# Patient Record
Sex: FEMALE | Race: WHITE
[De-identification: names, ages, dates, MRNs, and addresses within clinical notes are randomized per-mention and may not be internally consistent; named-entity substitution may affect disease eponyms.]

---
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
## 2022-11-03 ENCOUNTER — HOSPITAL ENCOUNTER (OUTPATIENT)
Dept: HOSPITAL 22 - LAB.DROPOF | Age: 22
End: 2022-11-03
Payer: COMMERCIAL

## 2022-11-03 DIAGNOSIS — R53.83: Primary | ICD-10-CM

## 2022-11-03 LAB
ALBUMIN LEVEL: 4.6 G/DL (ref 3.5–5)
ALBUMIN/GLOB SERPL: 1.4 {RATIO} (ref 1.1–1.8)
ALP ISO SERPL-ACNC: 93 U/L (ref 38–126)
ALT SERPLBLD-CCNC: 30 U/L (ref 12–78)
ANION GAP SERPL CALC-SCNC: 17.1 MEQ/L (ref 5–15)
AST SERPL QL: 30 U/L (ref 14–36)
BILIRUBIN,TOTAL: 0.3 MG/DL (ref 0.2–1.3)
BUN SERPL-MCNC: 11 MG/DL (ref 7–17)
CALCIUM SPEC-MCNC: 10.1 MG/DL (ref 8.4–10.2)
CELLS COUNTED: 100
CHLORIDE SPEC-SCNC: 102 MMOL/L (ref 98–107)
CO2 SERPL-SCNC: 24 MMOL/L (ref 22–30)
CREAT BLD-SCNC: 0.7 MG/DL (ref 0.52–1.04)
ESTIMATED GLOMERULAR FILT RATE: 105 ML/MIN (ref 60–?)
GFR (AFRICAN AMERICAN): 127 ML/MIN (ref 60–?)
GLOBULIN SER CALC-MCNC: 3.2 G/DL (ref 1.3–3.2)
GLUCOSE: 87 MG/DL (ref 74–100)
HCT VFR BLD CALC: 41.3 % (ref 37–47)
HGB BLD-MCNC: 13.1 G/DL (ref 12.2–16.2)
MANUAL DIFFERENTIAL: (no result)
MCHC RBC-ENTMCNC: 31.7 G/DL (ref 31.8–35.4)
MCV RBC: 83.6 FL (ref 81–99)
MEAN CORPUSCULAR HEMOGLOBIN: 26.5 PG (ref 27–31.2)
PLATELET # BLD: 484 K/MM3 (ref 142–424)
POTASSIUM: 4.1 MMOL/L (ref 3.5–5.1)
PROT SERPL-MCNC: 7.8 G/DL (ref 6.3–8.2)
RBC # BLD AUTO: 4.93 M/MM3 (ref 4.2–5.4)
SODIUM SPEC-SCNC: 139 MMOL/L (ref 136–145)
TSH SERPL-ACNC: 1.83 UIU/ML (ref 0.47–4.68)
WBC # BLD AUTO: 12.3 K/MM3 (ref 4.8–10.8)

## 2022-11-03 PROCEDURE — 85048 AUTOMATED LEUKOCYTE COUNT: CPT

## 2022-11-03 PROCEDURE — 85018 HEMOGLOBIN: CPT

## 2022-11-03 PROCEDURE — 80053 COMPREHEN METABOLIC PANEL: CPT

## 2022-11-03 PROCEDURE — 85014 HEMATOCRIT: CPT

## 2022-11-03 PROCEDURE — 84443 ASSAY THYROID STIM HORMONE: CPT

## 2022-11-03 PROCEDURE — 85049 AUTOMATED PLATELET COUNT: CPT

## 2022-11-03 PROCEDURE — 85007 BL SMEAR W/DIFF WBC COUNT: CPT

## 2023-02-06 ENCOUNTER — HOSPITAL ENCOUNTER (OUTPATIENT)
Age: 23
End: 2023-02-06
Payer: COMMERCIAL

## 2023-02-06 DIAGNOSIS — R50.9: Primary | ICD-10-CM

## 2023-02-06 PROCEDURE — U0005 INFEC AGEN DETEC AMPLI PROBE: HCPCS

## 2023-02-06 PROCEDURE — U0003 INFECTIOUS AGENT DETECTION BY NUCLEIC ACID (DNA OR RNA); SEVERE ACUTE RESPIRATORY SYNDROME CORONAVIRUS 2 (SARS-COV-2) (CORONAVIRUS DISEASE [COVID-19]), AMPLIFIED PROBE TECHNIQUE, MAKING USE OF HIGH THROUGHPUT TECHNOLOGIES AS DESCRIBED BY CMS-2020-01-R: HCPCS

## 2023-02-06 PROCEDURE — C9803 HOPD COVID-19 SPEC COLLECT: HCPCS

## 2023-04-14 ENCOUNTER — HOSPITAL ENCOUNTER (OUTPATIENT)
Dept: HOSPITAL 22 - RAD | Age: 23
End: 2023-04-14
Payer: COMMERCIAL

## 2023-04-14 DIAGNOSIS — N64.4: Primary | ICD-10-CM

## 2023-04-14 PROCEDURE — 76641 ULTRASOUND BREAST COMPLETE: CPT

## 2023-09-13 ENCOUNTER — HOSPITAL ENCOUNTER (OUTPATIENT)
Dept: HOSPITAL 22 - LAB.DROPOF | Age: 23
End: 2023-09-13
Payer: COMMERCIAL

## 2023-09-13 DIAGNOSIS — N92.6: Primary | ICD-10-CM

## 2023-09-13 DIAGNOSIS — Z32.00: ICD-10-CM

## 2023-09-13 PROCEDURE — 84703 CHORIONIC GONADOTROPIN ASSAY: CPT

## 2023-11-29 ENCOUNTER — LAB (OUTPATIENT)
Dept: LAB | Facility: HOSPITAL | Age: 23
End: 2023-11-29
Payer: COMMERCIAL

## 2023-11-29 ENCOUNTER — TRANSCRIBE ORDERS (OUTPATIENT)
Dept: LAB | Facility: HOSPITAL | Age: 23
End: 2023-11-29
Payer: COMMERCIAL

## 2023-11-29 DIAGNOSIS — N97.9 PRIMARY FEMALE INFERTILITY: Primary | ICD-10-CM

## 2023-11-29 DIAGNOSIS — N97.9 PRIMARY FEMALE INFERTILITY: ICD-10-CM

## 2023-11-29 PROCEDURE — 36415 COLL VENOUS BLD VENIPUNCTURE: CPT

## 2023-11-29 PROCEDURE — 84144 ASSAY OF PROGESTERONE: CPT

## 2023-11-30 LAB — PROGEST SERPL-MCNC: 8.52 NG/ML

## 2024-01-18 ENCOUNTER — TRANSCRIBE ORDERS (OUTPATIENT)
Dept: LAB | Facility: HOSPITAL | Age: 24
End: 2024-01-18
Payer: COMMERCIAL

## 2024-01-18 ENCOUNTER — LAB (OUTPATIENT)
Dept: LAB | Facility: HOSPITAL | Age: 24
End: 2024-01-18
Payer: COMMERCIAL

## 2024-01-18 DIAGNOSIS — N97.9 PRIMARY FEMALE INFERTILITY: Primary | ICD-10-CM

## 2024-01-18 DIAGNOSIS — N97.9 PRIMARY FEMALE INFERTILITY: ICD-10-CM

## 2024-01-18 LAB
ESTRADIOL SERPL HS-MCNC: 92.2 PG/ML
FSH SERPL-ACNC: 4.72 MIU/ML
LH SERPL-ACNC: 6.57 MIU/ML
PROGEST SERPL-MCNC: 0.16 NG/ML
PROLACTIN SERPL-MCNC: 12.6 NG/ML (ref 4.79–23.3)
TSH SERPL DL<=0.05 MIU/L-ACNC: 2.11 UIU/ML (ref 0.27–4.2)

## 2024-01-18 PROCEDURE — 84403 ASSAY OF TOTAL TESTOSTERONE: CPT

## 2024-01-18 PROCEDURE — 84443 ASSAY THYROID STIM HORMONE: CPT

## 2024-01-18 PROCEDURE — 82627 DEHYDROEPIANDROSTERONE: CPT

## 2024-01-18 PROCEDURE — 82397 CHEMILUMINESCENT ASSAY: CPT

## 2024-01-18 PROCEDURE — 84402 ASSAY OF FREE TESTOSTERONE: CPT

## 2024-01-18 PROCEDURE — 84144 ASSAY OF PROGESTERONE: CPT

## 2024-01-18 PROCEDURE — 84146 ASSAY OF PROLACTIN: CPT

## 2024-01-18 PROCEDURE — 36415 COLL VENOUS BLD VENIPUNCTURE: CPT

## 2024-01-18 PROCEDURE — 82670 ASSAY OF TOTAL ESTRADIOL: CPT

## 2024-01-18 PROCEDURE — 83001 ASSAY OF GONADOTROPIN (FSH): CPT

## 2024-01-18 PROCEDURE — 83498 ASY HYDROXYPROGESTERONE 17-D: CPT

## 2024-01-18 PROCEDURE — 83002 ASSAY OF GONADOTROPIN (LH): CPT

## 2024-01-19 LAB — DHEA-S SERPL-MCNC: 231 UG/DL (ref 110–431.7)

## 2024-01-21 LAB — MIS SERPL-MCNC: 2.93 NG/ML

## 2024-01-22 LAB — 17OHP SERPL-MCNC: 22 NG/DL

## 2024-01-28 LAB
TESTOST FREE SERPL-MCNC: 2.1 PG/ML (ref 0–4.2)
TESTOST SERPL-MCNC: 32 NG/DL (ref 13–71)

## 2024-04-15 ENCOUNTER — TRANSCRIBE ORDERS (OUTPATIENT)
Dept: LAB | Facility: HOSPITAL | Age: 24
End: 2024-04-15
Payer: COMMERCIAL

## 2024-04-15 ENCOUNTER — LAB (OUTPATIENT)
Dept: LAB | Facility: HOSPITAL | Age: 24
End: 2024-04-15
Payer: COMMERCIAL

## 2024-04-15 DIAGNOSIS — N92.6 IRREGULAR MENSTRUAL CYCLE: Primary | ICD-10-CM

## 2024-04-15 DIAGNOSIS — N92.6 IRREGULAR MENSTRUAL CYCLE: ICD-10-CM

## 2024-04-15 PROCEDURE — 84702 CHORIONIC GONADOTROPIN TEST: CPT

## 2024-04-15 PROCEDURE — 36415 COLL VENOUS BLD VENIPUNCTURE: CPT

## 2024-04-16 LAB — HCG INTACT+B SERPL-ACNC: <1 MIU/ML

## 2024-05-23 ENCOUNTER — TRANSCRIBE ORDERS (OUTPATIENT)
Dept: NUTRITION | Facility: HOSPITAL | Age: 24
End: 2024-05-23
Payer: COMMERCIAL

## 2024-06-17 ENCOUNTER — HOSPITAL ENCOUNTER (OUTPATIENT)
Dept: NUTRITION | Facility: HOSPITAL | Age: 24
Setting detail: RECURRING SERIES
Discharge: HOME | End: 2024-06-17
Payer: COMMERCIAL

## 2024-06-17 PROCEDURE — 97802 MEDICAL NUTRITION INDIV IN: CPT

## 2024-06-17 NOTE — CONSULTS
"[facility] Nutrition Services          Initial 60 Minute Nutrition Visit    Date: 2024   Patient Name: Annette Renner  : 2000   MRN: [id number]   Referring Provider: Cora Levine, *    Reason for Visit: Weight management  Visit Format: Telehealth    Nutrition Assessment       Social History:   Social History     Socioeconomic History    Marital status: Single   Tobacco Use    Smoking status: Never   Substance and Sexual Activity    Alcohol use: No    Drug use: No    Sexual activity: Defer     Active Problem List:   Patient Active Problem List    Diagnosis     Acute suppurative otitis media of left ear without spontaneous rupture of tympanic membrane [H66.002]     URI with cough and congestion [J06.9]       Current Medications:   Current Outpatient Medications:     cefdinir (OMNICEF) 300 MG capsule, Take 1 capsule by mouth 2 (Two) Times a Day., Disp: 20 capsule, Rfl: 0    neomycin-polymyxin-hydrocortisone (CORTISPORIN) 3.5-95625-3 otic solution, Administer 3 drops into the left ear 4 (Four) Times a Day., Disp: 10 mL, Rfl: 0    Recent Pertinent Labs: No recent labs    Hunger Vital Sign Food Insecurity Assessment:  Within the past 12 months I/we worried whether our food would run out before I/we got money to buy more: No   Within the past 12 months the food I/we bought just didn't last and I/we didn't have money to get more: No   Use of food assistance programs (WIC, food stamps, food abarca) No       Food & Nutrition Related History       Food Allergies: None noted  Food Intolerances: None noted  Food Behavior: Patient sometimes skips breakfast, reports she is a \"picky eater\"  Nutrition Impact Symptoms: nausea due to medications, occasional diarrhea since starting metformin  Gastrointestinal conditions that impact intake or food choices: None  Details at home: Lives with , currently TTC  Who prepares most meals: patient and partner/spouse  Who does grocery shopping: " "patient and partner/spouse  How many meals are purchased from fast food/sit down restaurants per week: 1x or less  Difficulty chewin - Normal  Difficulty swallowin - Normal  Diet requirement related to personal preference or cultural belief: Doesn't like fish  History of eating disorder/disordered eating habits: None  Language/communication details: English  Barriers to learning: None    24 Hour Recall: Did not obtain at this appointment  Additional comments: Patient reports that her diet is improving, but she struggles with ideas for meals and establishing habits. She drinks 3-4 bottles of water per day    Anthropometrics      Height:   Ht Readings from Last 1 Encounters:   19 175.3 cm (69\") (97%, Z= 1.85)*     * Growth percentiles are based on CDC (Girls, 2-20 Years) data.     Weight:   Wt Readings from Last 3 Encounters:   19 (!) 163 kg (359 lb) (>99%, Z= 3.06)*     * Growth percentiles are based on CDC (Girls, 2-20 Years) data.     BMI: There is no height or weight on file to calculate BMI.   Weight Change: Patient's weight from last PCP appointment was 442#. She reports she has lost ~5# since then     Physical Activity     Barriers to physical activity: None noted     Physical activity comments: did not discuss in this appointment     Estimated Needs     Estimated Energy Needs: Did not calculate for this appointment    Estimated Protein Needs: Starting protein goal: 25-30g/meal minimum; actual protein needs are significantly higher (~200g/day based on 1 g/kg)    Estimated Fluid Needs:  At least 64 oz/day    Discussion / Education      Annette Renner is a 23 y.o. female who has been referred for weight management. Her primary motivation is to try to conceive and prevent long term complications due to her weight. She also has PCOS. She has recently started on metformin and reports decreased sugar cravings and appetite. Side effects include occasional nausea and diarrhea, but patient reports " "they are manageable. Her primary barriers to change are portion control, snacking, emotional eating, overcoming cravings, and lack of nutrition knowledge. She lives with partner/spouse. Cooking and grocery shopping are done by patient and partner/spouse. She dines out <1 times per week. She has not previously seen a dietitian/nutritionist. She has already made positive changes including reducing fast food intake and cutting out soda.    RD provided education about the three macronutrients and the roles of each for promoting good health and balance. Patient was able to identify several foods in each category that they consume regularly. RD emphasized the importance of incorporating a variety of sources for each, as well as increasing fiber rich choices such as whole grains and vegetables/fruits to improve blood sugar, cholesterol, and satiety. RD reviewed the different types of dietary fat and the roles of each in managing cholesterol and reducing risks of heart disease and other complications. RD provided a visual of a \"balanced meal\" with adequate portions of carbohydrates, protein, and fruits/vegetables. Patient compared the visual to their own meals and was able to identify some areas for improved balance and portion sizes. RD also discussed the importance of eating a protein and fiber rich breakfast to improve energy levels and blood glucose throughout the rest of the day.    RD and patient worked to set several goals for patient. RD provided contact info and encouraged patient to reach out with any additional questions or concerns following the appointment.    Assessment of patient engagement: Engaged    Measurement of understanding: Patient verbalized understanding, Patient able to demonstrate understanding with teach back     Resources Provided:  Weight management packet    Goal (s)      Goal 1: 25-30g protein per meal     Goal 2: Review nutrition labels for excessive saturated fat, added sugar, and trans " fat.     Goal 3: 8g fiber per meal     Plan of Care     PES Statement:   Overweight / Obesity related to diet and lifestye as evidenced by BMI.     Follow Up Visit      Follow Up scheduled for July 30 @ 8:45 am via telehealth.    Total of 60 minutes spent with patient on nutrition counseling. Education based on Academy of [employer] Dietetics guidelines. Patient was provided with RD's contact information. Thank you for this referral.

## 2024-07-30 ENCOUNTER — HOSPITAL ENCOUNTER (OUTPATIENT)
Dept: HOSPITAL 22 - LAB.DROPOF | Age: 24
Discharge: HOME | End: 2024-07-30
Payer: COMMERCIAL

## 2024-07-30 DIAGNOSIS — R53.83: Primary | ICD-10-CM

## 2024-07-30 LAB
25-OH VITAMIN D, TOTAL: 25.1 NG/ML (ref 30–100)
ALBUMIN LEVEL: 3.9 G/DL (ref 3.5–5)
ALBUMIN/GLOB SERPL: 1.3 {RATIO} (ref 1.1–1.8)
ALP ISO SERPL-ACNC: 64 U/L (ref 38–126)
ALT SERPLBLD-CCNC: 30 U/L (ref 12–78)
ANION GAP SERPL CALC-SCNC: 12.4 MEQ/L (ref 5–15)
AST SERPL QL: 24 U/L (ref 14–36)
BILIRUBIN,TOTAL: 0.3 MG/DL (ref 0.2–1.3)
BUN SERPL-MCNC: 10 MG/DL (ref 7–17)
CALCIUM SPEC-MCNC: 9.8 MG/DL (ref 8.4–10.2)
CHLORIDE SPEC-SCNC: 107 MMOL/L (ref 98–107)
CHOLEST SPEC-SCNC: 192 MG/DL (ref 140–200)
CO2 SERPL-SCNC: 22 MMOL/L (ref 22–30)
CREAT BLD-SCNC: 0.7 MG/DL (ref 0.52–1.04)
ESTIMATED GLOMERULAR FILT RATE: 103 ML/MIN (ref 60–?)
FT4I SERPL CALC-MCNC: 3 UG/DL (ref 5.93–13.13)
GFR (AFRICAN AMERICAN): 124 ML/MIN (ref 60–?)
GLOBULIN SER CALC-MCNC: 3 G/DL (ref 1.3–3.2)
GLUCOSE: 99 MG/DL (ref 74–100)
HBA1C MFR BLD: 5 % (ref 4–6)
HCT VFR BLD CALC: 37.1 % (ref 37–47)
HDLC SERPL-MCNC: 42 MG/DL (ref 40–60)
HGB BLD-MCNC: 11.8 G/DL (ref 12.2–16.2)
IRON SERPL QL: 55 UG/DL (ref 37–170)
MCHC RBC-ENTMCNC: 31.9 G/DL (ref 31.8–35.4)
MCV RBC: 84.2 FL (ref 81–99)
MEAN CORPUSCULAR HEMOGLOBIN: 26.9 PG (ref 27–31.2)
PLATELET # BLD: 354 K/MM3 (ref 142–424)
POTASSIUM: 4.4 MMOL/L (ref 3.5–5.1)
PROT SERPL-MCNC: 6.9 G/DL (ref 6.3–8.2)
RBC # BLD AUTO: 4.41 M/MM3 (ref 4.2–5.4)
SODIUM SPEC-SCNC: 137 MMOL/L (ref 136–145)
T3RU NFR SERPL: 29 % (ref 23.5–40.5)
T4 (THYROXINE): 10.4 UG/DL (ref 5.53–11)
TOTAL IRON BINDING CAPACITY: 357 UG/DL (ref 265–497)
TRIGLYCERIDES: 108 MG/DL (ref 30–150)
TSH SERPL-ACNC: 0.02 UIU/ML (ref 0.47–4.68)
TSH SERPL-ACNC: 0.03 UIU/ML (ref 0.47–4.68)
VITAMIN B12: 307 PG/ML (ref 239–931)
WBC # BLD AUTO: 10 K/MM3 (ref 4.8–10.8)

## 2024-07-30 PROCEDURE — 83550 IRON BINDING TEST: CPT

## 2024-07-30 PROCEDURE — 82607 VITAMIN B-12: CPT

## 2024-07-30 PROCEDURE — 84443 ASSAY THYROID STIM HORMONE: CPT

## 2024-07-30 PROCEDURE — 84436 ASSAY OF TOTAL THYROXINE: CPT

## 2024-07-30 PROCEDURE — 83036 HEMOGLOBIN GLYCOSYLATED A1C: CPT

## 2024-07-30 PROCEDURE — 85025 COMPLETE CBC W/AUTO DIFF WBC: CPT

## 2024-07-30 PROCEDURE — 80053 COMPREHEN METABOLIC PANEL: CPT

## 2024-07-30 PROCEDURE — 82306 VITAMIN D 25 HYDROXY: CPT

## 2024-07-30 PROCEDURE — 80050 GENERAL HEALTH PANEL: CPT

## 2024-07-30 PROCEDURE — 80061 LIPID PANEL: CPT

## 2024-07-30 PROCEDURE — 84479 ASSAY OF THYROID (T3 OR T4): CPT

## 2024-07-30 PROCEDURE — 83540 ASSAY OF IRON: CPT

## 2024-09-09 ENCOUNTER — HOSPITAL ENCOUNTER (OUTPATIENT)
Dept: HOSPITAL 22 - LAB.DROPOF | Age: 24
Discharge: HOME | End: 2024-09-09
Payer: COMMERCIAL

## 2024-09-09 DIAGNOSIS — R79.89: Primary | ICD-10-CM

## 2024-09-09 LAB — TSH SERPL-ACNC: 2.4 UIU/ML (ref 0.47–4.68)

## 2024-09-09 PROCEDURE — 84443 ASSAY THYROID STIM HORMONE: CPT

## 2024-12-11 ENCOUNTER — HOSPITAL ENCOUNTER (OUTPATIENT)
Dept: HOSPITAL 22 - RAD | Age: 24
Discharge: HOME | End: 2024-12-11
Payer: COMMERCIAL

## 2024-12-11 DIAGNOSIS — E66.01: ICD-10-CM

## 2024-12-11 DIAGNOSIS — E28.2: Primary | ICD-10-CM

## 2024-12-11 DIAGNOSIS — Z31.9: ICD-10-CM

## 2024-12-11 PROCEDURE — 76830 TRANSVAGINAL US NON-OB: CPT

## 2024-12-11 NOTE — US_ITS
PROCEDURE:  US TRANSVAGINAL 
 
CLINICAL INDICATION:  abnormal menses 
 
 
COMPARISON:  No exams were available for comparison 
 
 
FINDINGS: 
 
Transvaginal sonographic images of the pelvis were obtained. 
 
UTERUS: 8.3cm x 5.2cmx 3.9cm anteverted with a combined endometrial  
thickness of 10.9mm.   
The endometrium is trilaminar. 
 
LEFT OVARY: 3.8cmx3.4cmx2.0cm with a volume of 13.5ml. 
There is an irregular shaped follicle within the left ovary measuring  
1.7 cm x 1.7 cm x 1.3 cm.  Likely a corpus luteum. 
 
RIGHT OVARY: 2.6cmx 2.6cmx2.6cm with a volume of 9.6ml.  
There are several small peripheral follicles in the right ovary. 
 
Both ovaries are seen and appear normal. 
Doppler flow to both ovaries are seen. 
There is no fluid in the cul-de-sac. 
 
IMPRESSION: 
 
     1. Anteverted uterus normal in shape and size.  The endometrium  
appears normal and trilaminar. 
 
     2. Both ovaries are seen and appear normal. There is a corpus  
luteum in  the left ovary. 
 
     3. No fluid in the cul-de-sac. 
 
Dictated by:   Jenaro Cazares MD  12/12/2024 02:49 
 Jenaro Cazares MD in [place] 12/12/2024 02:49

## 2024-12-17 ENCOUNTER — HOSPITAL ENCOUNTER (OUTPATIENT)
Dept: HOSPITAL 22 - LAB | Age: 24
Discharge: HOME | End: 2024-12-17
Payer: COMMERCIAL

## 2024-12-17 DIAGNOSIS — E28.2: Primary | ICD-10-CM

## 2024-12-17 DIAGNOSIS — Z31.9: ICD-10-CM

## 2024-12-17 DIAGNOSIS — E66.01: ICD-10-CM

## 2024-12-17 LAB
ALBUMIN LEVEL: 4.3 G/DL (ref 3.5–5)
ALBUMIN/GLOB SERPL: 1.5 {RATIO} (ref 1.1–1.8)
ALP ISO SERPL-ACNC: 61 U/L (ref 38–126)
ALT SERPLBLD-CCNC: 35 U/L (ref 12–78)
ANION GAP SERPL CALC-SCNC: 10.7 MEQ/L (ref 5–15)
AST SERPL QL: 30 U/L (ref 14–36)
BILIRUBIN,TOTAL: 0.5 MG/DL (ref 0.2–1.3)
BUN SERPL-MCNC: 9 MG/DL (ref 7–17)
CALCIUM SPEC-MCNC: 9.6 MG/DL (ref 8.4–10.2)
CHLORIDE SPEC-SCNC: 107 MMOL/L (ref 98–107)
CHOLEST SPEC-SCNC: 202 MG/DL (ref 140–200)
CO2 SERPL-SCNC: 22 MMOL/L (ref 22–30)
CREAT BLD-SCNC: 0.7 MG/DL (ref 0.52–1.04)
ESTIMATED GLOMERULAR FILT RATE: 103 ML/MIN (ref 60–?)
FERRITIN SERPL-MCNC: 32.2 NG/ML (ref 6.24–137)
GFR (AFRICAN AMERICAN): 124 ML/MIN (ref 60–?)
GLOBULIN SER CALC-MCNC: 2.8 G/DL (ref 1.3–3.2)
GLUCOSE: 93 MG/DL (ref 74–100)
HBA1C MFR BLD: 5.2 % (ref 4–6)
HCT VFR BLD CALC: 39 % (ref 37–47)
HDLC SERPL-MCNC: 37 MG/DL (ref 40–60)
HGB BLD-MCNC: 12.6 G/DL (ref 12.2–16.2)
MAGNESIUM: 2 MG/DL (ref 1.6–2.3)
MCHC RBC-ENTMCNC: 32.3 G/DL (ref 31.8–35.4)
MCV RBC: 80.9 FL (ref 81–99)
MEAN CORPUSCULAR HEMOGLOBIN: 26.1 PG (ref 27–31.2)
PLATELET # BLD: 359 K/MM3 (ref 142–424)
POTASSIUM: 4.7 MMOL/L (ref 3.5–5.1)
PROT SERPL-MCNC: 7.1 G/DL (ref 6.3–8.2)
RBC # BLD AUTO: 4.82 M/MM3 (ref 4.2–5.4)
SODIUM SPEC-SCNC: 135 MMOL/L (ref 136–145)
TRIGLYCERIDES: 148 MG/DL (ref 30–150)
TSH SERPL-ACNC: 2.94 UIU/ML (ref 0.47–4.68)
URATE SERPL-SCNC: 5.3 MG/DL (ref 2.5–6.2)
WBC # BLD AUTO: 8.2 K/MM3 (ref 4.8–10.8)

## 2024-12-17 PROCEDURE — 83001 ASSAY OF GONADOTROPIN (FSH): CPT

## 2024-12-17 PROCEDURE — 84443 ASSAY THYROID STIM HORMONE: CPT

## 2024-12-17 PROCEDURE — 82728 ASSAY OF FERRITIN: CPT

## 2024-12-17 PROCEDURE — 82397 CHEMILUMINESCENT ASSAY: CPT

## 2024-12-17 PROCEDURE — 82670 ASSAY OF TOTAL ESTRADIOL: CPT

## 2024-12-17 PROCEDURE — 36415 COLL VENOUS BLD VENIPUNCTURE: CPT

## 2024-12-17 PROCEDURE — 84550 ASSAY OF BLOOD/URIC ACID: CPT

## 2024-12-17 PROCEDURE — 85025 COMPLETE CBC W/AUTO DIFF WBC: CPT

## 2024-12-17 PROCEDURE — 83525 ASSAY OF INSULIN: CPT

## 2024-12-17 PROCEDURE — 80053 COMPREHEN METABOLIC PANEL: CPT

## 2024-12-17 PROCEDURE — 84403 ASSAY OF TOTAL TESTOSTERONE: CPT

## 2024-12-17 PROCEDURE — 83735 ASSAY OF MAGNESIUM: CPT

## 2024-12-17 PROCEDURE — 80050 GENERAL HEALTH PANEL: CPT

## 2024-12-17 PROCEDURE — 83036 HEMOGLOBIN GLYCOSYLATED A1C: CPT

## 2024-12-17 PROCEDURE — 80061 LIPID PANEL: CPT

## 2024-12-18 LAB
FSH: 5.7 MIU/ML
INSULIN SERPL-MCNC: 41.4 UIU/ML (ref 2.6–24.9)
TESTOSTERONE,TOTAL: 32 NG/DL (ref 13–71)

## 2024-12-20 LAB — ANTI MULLERIAN HORMONE (AMH): 3.15 NG/ML

## 2025-07-07 ENCOUNTER — HOSPITAL ENCOUNTER (OUTPATIENT)
Age: 25
Discharge: HOME | End: 2025-07-07
Payer: COMMERCIAL

## 2025-07-07 DIAGNOSIS — Z34.90: Primary | ICD-10-CM

## 2025-07-07 LAB — HCG,QUANTITATIVE: 418 MIU/ML (ref 0–5.42)

## 2025-07-07 PROCEDURE — 84702 CHORIONIC GONADOTROPIN TEST: CPT

## 2025-07-07 PROCEDURE — 84144 ASSAY OF PROGESTERONE: CPT

## 2025-07-07 PROCEDURE — 36415 COLL VENOUS BLD VENIPUNCTURE: CPT

## 2025-07-08 LAB — PROGESTERONE: 9.5 NG/ML

## 2025-07-09 ENCOUNTER — HOSPITAL ENCOUNTER (OUTPATIENT)
Dept: HOSPITAL 22 - LAB | Age: 25
Discharge: HOME | End: 2025-07-09
Payer: COMMERCIAL

## 2025-07-09 DIAGNOSIS — Z32.01: Primary | ICD-10-CM

## 2025-07-09 LAB — HCG,QUANTITATIVE: 1180 MIU/ML (ref 0–5.42)

## 2025-07-09 PROCEDURE — 84702 CHORIONIC GONADOTROPIN TEST: CPT

## 2025-07-09 PROCEDURE — 36415 COLL VENOUS BLD VENIPUNCTURE: CPT

## 2025-07-17 ENCOUNTER — HOSPITAL ENCOUNTER (OUTPATIENT)
Dept: HOSPITAL 22 - LAB | Age: 25
Discharge: HOME | End: 2025-07-17
Payer: COMMERCIAL

## 2025-07-17 DIAGNOSIS — Z34.90: Primary | ICD-10-CM

## 2025-07-17 LAB — HCG,QUANTITATIVE: (no result) MIU/ML (ref 0–5.42)

## 2025-07-17 PROCEDURE — 36415 COLL VENOUS BLD VENIPUNCTURE: CPT

## 2025-07-17 PROCEDURE — 84702 CHORIONIC GONADOTROPIN TEST: CPT

## 2025-08-14 ENCOUNTER — TRANSCRIBE ORDERS (OUTPATIENT)
Dept: OBSTETRICS AND GYNECOLOGY | Facility: HOSPITAL | Age: 25
End: 2025-08-14
Payer: COMMERCIAL

## 2025-08-14 DIAGNOSIS — Z34.90 PREGNANCY, UNSPECIFIED GESTATIONAL AGE: ICD-10-CM

## 2025-08-14 DIAGNOSIS — E66.9 SUPER OBESITY: Primary | ICD-10-CM
